# Patient Record
Sex: MALE | Race: WHITE | NOT HISPANIC OR LATINO
[De-identification: names, ages, dates, MRNs, and addresses within clinical notes are randomized per-mention and may not be internally consistent; named-entity substitution may affect disease eponyms.]

---

## 2021-04-06 PROBLEM — Z00.00 ENCOUNTER FOR PREVENTIVE HEALTH EXAMINATION: Status: ACTIVE | Noted: 2021-04-06

## 2021-04-28 ENCOUNTER — APPOINTMENT (OUTPATIENT)
Dept: NEUROLOGY | Facility: CLINIC | Age: 71
End: 2021-04-28

## 2021-11-12 ENCOUNTER — APPOINTMENT (OUTPATIENT)
Dept: VASCULAR SURGERY | Facility: CLINIC | Age: 71
End: 2021-11-12
Payer: MEDICARE

## 2021-11-12 PROCEDURE — 99213 OFFICE O/P EST LOW 20 MIN: CPT

## 2023-01-01 ENCOUNTER — LABORATORY RESULT (OUTPATIENT)
Age: 73
End: 2023-01-01

## 2023-01-01 ENCOUNTER — APPOINTMENT (OUTPATIENT)
Dept: GERIATRICS | Facility: HOME HEALTH | Age: 73
End: 2023-01-01
Payer: MEDICARE

## 2023-01-01 VITALS
SYSTOLIC BLOOD PRESSURE: 105 MMHG | HEIGHT: 67 IN | HEART RATE: 59 BPM | BODY MASS INDEX: 20.25 KG/M2 | WEIGHT: 129 LBS | TEMPERATURE: 96.8 F | DIASTOLIC BLOOD PRESSURE: 60 MMHG | OXYGEN SATURATION: 93 %

## 2023-01-01 DIAGNOSIS — G47.00 INSOMNIA, UNSPECIFIED: ICD-10-CM

## 2023-01-01 DIAGNOSIS — H61.23 IMPACTED CERUMEN, BILATERAL: ICD-10-CM

## 2023-01-01 PROCEDURE — 99344 HOME/RES VST NEW MOD MDM 60: CPT

## 2023-01-01 RX ORDER — DEXAMETHASONE 1.5 MG/1
1.5 TABLET ORAL DAILY
Refills: 0 | Status: ACTIVE | COMMUNITY
Start: 2023-01-01

## 2023-01-01 RX ORDER — RIVAROXABAN 10 MG/1
10 TABLET, FILM COATED ORAL DAILY
Refills: 0 | Status: ACTIVE | COMMUNITY
Start: 2023-01-01

## 2023-12-07 PROBLEM — G47.00 INSOMNIA, UNSPECIFIED TYPE: Status: ACTIVE | Noted: 2023-01-01

## 2023-12-07 PROBLEM — H61.23 BILATERAL IMPACTED CERUMEN: Status: ACTIVE | Noted: 2023-01-01

## 2024-01-01 ENCOUNTER — APPOINTMENT (OUTPATIENT)
Dept: GERIATRICS | Facility: HOME HEALTH | Age: 74
End: 2024-01-01
Payer: MEDICARE

## 2024-01-01 VITALS — OXYGEN SATURATION: 92 % | WEIGHT: 110 LBS | TEMPERATURE: 94.5 F | HEART RATE: 64 BPM | BODY MASS INDEX: 17.23 KG/M2

## 2024-01-01 VITALS
SYSTOLIC BLOOD PRESSURE: 100 MMHG | DIASTOLIC BLOOD PRESSURE: 60 MMHG | OXYGEN SATURATION: 98 % | HEART RATE: 66 BPM | TEMPERATURE: 98.2 F

## 2024-01-01 VITALS — DIASTOLIC BLOOD PRESSURE: 70 MMHG | SYSTOLIC BLOOD PRESSURE: 110 MMHG

## 2024-01-01 DIAGNOSIS — E78.00 PURE HYPERCHOLESTEROLEMIA, UNSPECIFIED: ICD-10-CM

## 2024-01-01 DIAGNOSIS — I82.509 CHRONIC EMBOLISM AND THROMBOSIS OF UNSPECIFIED DEEP VEINS OF UNSPECIFIED LOWER EXTREMITY: ICD-10-CM

## 2024-01-01 DIAGNOSIS — C71.9 MALIGNANT NEOPLASM OF BRAIN, UNSPECIFIED: ICD-10-CM

## 2024-01-01 DIAGNOSIS — G40.909 EPILEPSY, UNSPECIFIED, NOT INTRACTABLE, W/OUT STATUS EPILEPTICUS: ICD-10-CM

## 2024-01-01 PROCEDURE — 99349 HOME/RES VST EST MOD MDM 40: CPT

## 2024-01-01 RX ORDER — DIVALPROEX SODIUM 125 MG/1
125 CAPSULE, COATED PELLETS ORAL
Refills: 0 | Status: ACTIVE | COMMUNITY
Start: 2024-01-01

## 2024-01-01 RX ORDER — SULFAMETHOXAZOLE AND TRIMETHOPRIM 800; 160 MG/1; MG/1
800-160 TABLET ORAL
Refills: 0 | Status: DISCONTINUED | COMMUNITY
Start: 2023-01-01 | End: 2024-01-01

## 2024-01-01 RX ORDER — ATORVASTATIN CALCIUM 40 MG/1
40 TABLET, FILM COATED ORAL
Qty: 90 | Refills: 3 | Status: DISCONTINUED | COMMUNITY
Start: 2023-01-01 | End: 2024-01-01

## 2024-01-01 RX ORDER — LACOSAMIDE 10 MG/ML
10 SOLUTION ORAL
Refills: 0 | Status: ACTIVE | COMMUNITY
Start: 2024-01-01

## 2024-01-01 RX ORDER — DIVALPROEX SODIUM 250 MG/1
250 TABLET, EXTENDED RELEASE ORAL AS DIRECTED
Refills: 0 | Status: DISCONTINUED | COMMUNITY
Start: 2023-01-01 | End: 2024-01-01

## 2024-01-01 RX ORDER — LEVETIRACETAM 750 MG/1
750 TABLET, FILM COATED ORAL 4 TIMES DAILY
Refills: 0 | Status: DISCONTINUED | COMMUNITY
Start: 2023-01-01 | End: 2024-01-01

## 2024-01-01 RX ORDER — TRAZODONE HYDROCHLORIDE 50 MG/1
50 TABLET ORAL
Qty: 90 | Refills: 3 | Status: DISCONTINUED | COMMUNITY
Start: 2023-01-01 | End: 2024-01-01

## 2024-01-01 RX ORDER — LEVETIRACETAM 250 MG/1
250 TABLET, FILM COATED ORAL
Refills: 0 | Status: DISCONTINUED | COMMUNITY
Start: 2023-01-01 | End: 2024-01-01

## 2024-01-01 RX ORDER — LEVETIRACETAM 100 MG/ML
100 SOLUTION ORAL
Refills: 0 | Status: ACTIVE | COMMUNITY
Start: 2024-01-01

## 2024-01-01 RX ORDER — LACOSAMIDE 200 MG/1
200 TABLET ORAL 3 TIMES DAILY
Refills: 0 | Status: DISCONTINUED | COMMUNITY
Start: 2023-01-01 | End: 2024-01-01

## 2024-02-21 PROBLEM — E78.00 HIGH BLOOD CHOLESTEROL: Status: ACTIVE | Noted: 2023-01-01

## 2024-02-21 NOTE — HISTORY OF PRESENT ILLNESS
[FreeTextEntry1] : PT IS a 73 year old man - son in law of the [place]. Formerly healthy and active. 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  In April, 2021 he was outside mowing the lawn and he collapsed and had a seizure. Formerly well - although in retrospect wife remembers him dragging his right leg a little when hiking. He was taken to [facility] - intially diagnosed with back sprain. Had right foot drop. Went to see neuro - Dr Randi Lenz - at [facility] - she ordered brain MRI and diagnosed a glioblastoma. He then went to  [facility] where he had an open craniotomy. Then RT and chemo - TMZ (timozolamide) He was placed on keppra and in July 2021 - had a prolonged siezure - tumor had recurred - never walked well again. He now wears electric stimulator - [facility] - "Tumor treating fields" - initially part of a study - now  continuing gets an MRI q 6 months no recent  seizures in 2022 - had a DVT lost weight 190 to  129 (although he looks more than 129) has paralell bars, standing ani, ramps, home  aide never alone  is alert at times- remembers my name - answers a few  questions wife provides histpry  she is the proxy  hard of hearing - has hearing aides  he has had FLu, covid and RSV vaccines and prevnar in past  2/21/24 f/up vit D was low started vit D had repeat labs feb 5 now vit D 33.6 hb up  albumin 3.3 repeat hb 14.3 keppra 43.7 no seizures saw neurologist in Jan virtual has feb MRI scheduled -  he seems stable same meds

## 2024-02-21 NOTE — PHYSICAL EXAM
[Alert] : alert [Well Nourished] : well nourished [Healthy Appearing] : healthy appearing [Well Developed] : well developed [Normal Voice/Communication] : normal voice/communication [Normal Hearing] : hearing was not normal [Both Tympanic Membranes Were Examined] : both tympanic membranes were normal [No Respiratory Distress] : no respiratory distress [No Acc Muscle Use] : no accessory muscle use [Respiration, Rhythm And Depth] : normal respiratory rhythm and effort [Auscultation Breath Sounds / Voice Sounds] : lungs were clear to auscultation bilaterally [Normal S1, S2] : normal S1 and S2 [Murmurs] : no murmurs [Heart Rate And Rhythm] : heart rate was normal and rhythm regular [Heart Sounds Gallop] : no gallops [No Rubs] : no pericardial rub [Edema] : edema was not present [Abdomen Tenderness] : non-tender [Abdomen Soft] : soft [Cervical Lymph Nodes Enlarged Posterior Bilaterally] : posterior cervical [Cervical Lymph Nodes Enlarged Anterior Bilaterally] : anterior cervical, supraclavicular [No Clubbing, Cyanosis] : no clubbing or cyanosis of the fingernails [Involuntary Movements] : no involuntary movements were seen [Normal] : normal skin color and pigmentation [de-identified] : head covered in electrical leads - in a  wheel chair -  [de-identified] : clearned cerumen out of ears with bionix lit currette - hearing aides [de-identified] : no skin breakdown reported [de-identified] : no motion of left leg , slight on right [de-identified] : left hemiparesis [de-identified] : able to answer a few questions   -mostly quiet - looks to wife Cody

## 2024-02-21 NOTE — ASSESSMENT
[FreeTextEntry1] : pt is a 73 year old man with glioblastoma and seizure disorder no recent seizures labs done is now  on vit D and corrected to 33.9 he is   also  followed remotely by neuro gets home PT

## 2024-05-16 PROBLEM — C71.9 GLIOBLASTOMA: Status: ACTIVE | Noted: 2023-01-01

## 2024-05-16 PROBLEM — I82.509 CHRONIC DEEP VEIN THROMBOSIS (DVT) OF LOWER EXTREMITY, UNSPECIFIED LATERALITY, UNSPECIFIED VEIN: Status: ACTIVE | Noted: 2023-01-01

## 2024-05-16 PROBLEM — G40.909 SEIZURE DISORDER: Status: ACTIVE | Noted: 2023-01-01

## 2024-05-16 NOTE — REVIEW OF SYSTEMS
[Recent Weight Loss (___ Lbs)] : recent [unfilled] ~Ulb weight loss [Loss Of Hearing] : hearing loss [Incontinence] : incontinence [Confused] : confusion [Negative] : Heme/Lymph [Constipation] : no constipation [Diarrhea] : no diarrhea [de-identified] : nonverbal

## 2024-05-16 NOTE — HISTORY OF PRESENT ILLNESS
[FreeTextEntry1] : PT IS a 73 year old man - son in law of the [place]. Formerly healthy and active. 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  In April, 2021 he was outside mowing the lawn and he collapsed and had a seizure. Formerly well - although in retrospect wife remembers him dragging his right leg a little when hiking. He was taken to [facility] - intially diagnosed with back sprain. Had right foot drop. Went to see neuro - Dr Randi Lnez - at [facility] - she ordered brain MRI and diagnosed a glioblastoma. He then went to  [facility] where he had an open craniotomy. Then RT and chemo - TMZ (timozolamide) He was placed on keppra and in July 2021 - had a prolonged siezure - tumor had recurred - never walked well again. He now wears electric stimulator - [facility] - "Tumor treating fields" - initially part of a study - now  continuing gets an MRI q 6 months no recent  seizures in 2022 - had a DVT lost weight 190 to  129 (although he looks more than 129) has paralell bars, standing ani, ramps, home  aide never alone  is alert at times- remembers my name - answers a few  questions wife provides histpry  she is the proxy  hard of hearing - has hearing aides  he has had FLu, covid and RSV vaccines and prevnar in past  2/21/24 f/up vit D was low started vit D had repeat labs feb 5 now vit D 33.6 hb up  albumin 3.3 repeat hb 14.3 keppra 43.7 no seizures saw neurologist in Jan virtual has feb MRI scheduled -  he seems stable same meds  5/16/24 pts glioblastoma has worsened and spread to both parts of the brain off chemo - not working stopped speaking blinking eyes to respond to wife Pauline uncertain if pain no seizures - on three meds cannot swallow meds temporarilyt on increased dexamethasone to 4 mg to help with possible edema for last week not helped with hemiparesis nor speeck plans to resume 1.5

## 2024-05-16 NOTE — ASSESSMENT
[FreeTextEntry1] : 73 year old with glioblastoma progressive with spread through out brain has no proxy no hospitalization considering hospice  comfort care avoid seizures tylenol to start for pain  soft foods thick it jello ensure  home DNR signed

## 2024-05-16 NOTE — PHYSICAL EXAM
[Alert] : alert [Well Nourished] : well nourished [Healthy Appearing] : healthy appearing [Well Developed] : well developed [Normal Voice/Communication] : normal voice/communication [Both Tympanic Membranes Were Examined] : both tympanic membranes were normal [No Respiratory Distress] : no respiratory distress [No Acc Muscle Use] : no accessory muscle use [Respiration, Rhythm And Depth] : normal respiratory rhythm and effort [Auscultation Breath Sounds / Voice Sounds] : lungs were clear to auscultation bilaterally [Normal S1, S2] : normal S1 and S2 [Murmurs] : no murmurs [Heart Rate And Rhythm] : heart rate was normal and rhythm regular [Heart Sounds Gallop] : no gallops [No Rubs] : no pericardial rub [Edema] : edema was not present [Abdomen Tenderness] : non-tender [Abdomen Soft] : soft [Cervical Lymph Nodes Enlarged Posterior Bilaterally] : posterior cervical [Cervical Lymph Nodes Enlarged Anterior Bilaterally] : anterior cervical, supraclavicular [No Clubbing, Cyanosis] : no clubbing or cyanosis of the fingernails [Involuntary Movements] : no involuntary movements were seen [Normal] : normal skin color and pigmentation [Normal Hearing] : hearing was not normal [de-identified] : awake - nonverbal- in a  wheel chair -  [de-identified] : clearned cerumen out of ears with bionix lit currette - minimal   - mouth some coating back of  tongue [de-identified] : no skin breakdown reported [de-identified] : no motion of left leg , rt jayla paresis [de-identified] : left hemiparesis rt paralyzed as well - gripping object in rt hand [de-identified] : no speech at all

## 2024-06-13 ENCOUNTER — APPOINTMENT (OUTPATIENT)
Dept: GERIATRICS | Facility: HOME HEALTH | Age: 74
End: 2024-06-13